# Patient Record
Sex: FEMALE | Race: OTHER | URBAN - METROPOLITAN AREA
[De-identification: names, ages, dates, MRNs, and addresses within clinical notes are randomized per-mention and may not be internally consistent; named-entity substitution may affect disease eponyms.]

---

## 2023-05-30 ENCOUNTER — IMMUNIZATION (OUTPATIENT)
Age: 28
End: 2023-05-30

## 2023-05-30 DIAGNOSIS — Z11.1 SCREENING EXAMINATION FOR PULMONARY TUBERCULOSIS: Primary | ICD-10-CM

## 2023-05-30 NOTE — PROGRESS NOTES
PPD Placement note  Prakash Sanchez, 33 y.o. female is here today for placement of PPD test  Reason for PPD test: Employment  Pt taken PPD test before: no  Verified in allergy area and with patient that they are not allergic to the products PPD is made of (Phenol or Tween). No:   Is patient taking any oral or IV steroid medication now or have they taken it in the last month? no  Has the patient ever received the BCG vaccine?: no  Has the patient been in recent contact with anyone known or suspected of having active TB disease?: n       Date of exposure (if applicable):   Name of person they were exposed to (if applicable):   Patient's Country of origin?:   O: Alert and oriented in NAD. P:  PPD placed on 5/30/2023. Patient advised to return for reading within 48-72 hours.  Sharif Joshi RN

## 2023-06-01 ENCOUNTER — IMMUNIZATION (OUTPATIENT)
Age: 28
End: 2023-06-01

## 2023-06-01 NOTE — PROGRESS NOTES
PPD Reading Note  PPD read and results entered in [address]. Result: 0 mm induration.   Interpretation: negative  If test not read within 48-72 hours of initial placement, patient advised to repeat in other arm 1-3 weeks after this test.  Allergic reaction: no   Test read by CHAPINCITO Ramos RN

## 2023-06-01 NOTE — PATIENT INSTRUCTIONS
Patient given two copies of Tb skin test record. Document scanned into chart.    Lanette Anderson RN